# Patient Record
Sex: MALE | Race: NATIVE HAWAIIAN OR OTHER PACIFIC ISLANDER
[De-identification: names, ages, dates, MRNs, and addresses within clinical notes are randomized per-mention and may not be internally consistent; named-entity substitution may affect disease eponyms.]

---

## 2022-01-20 PROBLEM — Z00.00 ENCOUNTER FOR PREVENTIVE HEALTH EXAMINATION: Status: ACTIVE | Noted: 2022-01-20

## 2022-02-03 ENCOUNTER — NON-APPOINTMENT (OUTPATIENT)
Age: 37
End: 2022-02-03

## 2022-02-03 ENCOUNTER — APPOINTMENT (OUTPATIENT)
Dept: HEART AND VASCULAR | Facility: CLINIC | Age: 37
End: 2022-02-03
Payer: COMMERCIAL

## 2022-02-03 VITALS
OXYGEN SATURATION: 99 % | DIASTOLIC BLOOD PRESSURE: 80 MMHG | RESPIRATION RATE: 16 BRPM | HEART RATE: 58 BPM | SYSTOLIC BLOOD PRESSURE: 110 MMHG

## 2022-02-03 VITALS — HEIGHT: 70 IN | WEIGHT: 200 LBS | TEMPERATURE: 97.9 F | BODY MASS INDEX: 28.63 KG/M2

## 2022-02-03 DIAGNOSIS — R55 SYNCOPE AND COLLAPSE: ICD-10-CM

## 2022-02-03 DIAGNOSIS — Z82.5 FAMILY HISTORY OF ASTHMA AND OTHER CHRONIC LOWER RESPIRATORY DISEASES: ICD-10-CM

## 2022-02-03 DIAGNOSIS — Z78.9 OTHER SPECIFIED HEALTH STATUS: ICD-10-CM

## 2022-02-03 DIAGNOSIS — E55.9 VITAMIN D DEFICIENCY, UNSPECIFIED: ICD-10-CM

## 2022-02-03 PROCEDURE — 99205 OFFICE O/P NEW HI 60 MIN: CPT

## 2022-02-03 PROCEDURE — 93000 ELECTROCARDIOGRAM COMPLETE: CPT

## 2022-02-03 RX ORDER — CHROMIUM 200 MCG
1000 TABLET ORAL DAILY
Refills: 0 | Status: ACTIVE | COMMUNITY

## 2022-02-03 RX ORDER — MULTIVIT-MIN/FOLIC/VIT K/LYCOP 400-300MCG
1000 TABLET ORAL DAILY
Refills: 0 | Status: ACTIVE | COMMUNITY

## 2022-02-03 NOTE — DISCUSSION/SUMMARY
[Syncope of Unknown Origin] : syncope of unknown origin [Stable] : stable [None] : There are no changes in medication management [Patient] : the patient

## 2022-02-03 NOTE — HISTORY OF PRESENT ILLNESS
[FreeTextEntry1] : Braxton Panchal is a 38 yo male who presents for CV evaluation.  \par \par On December 24, 2021, while sitting at his kitchen table writing holiday cards, he experienced sudden severe nausea and lightheadedness.  After asking his wife to come to him, she noted that he was staring at his pen, not responding to her.  She states that he slumped and slid off his chair and his eyes rolled back.  She assisted him on the floor so as to maintain an open airway.  He did not have urinary or bowel incontinence.  He may have had some extremity jerking and tongue biting.  After a short period of time, he was awake and oriented but noted to have blurry vision.  After several minutes, he was back to normal.  He did not seek any medical attention til nearly 14 hours later at [facility].  Initial eval with blood work and CT of head were unremarkable.  Outpatient Neurology eval is ongoing ([facility]).  MRI of the head was also unremarkable.  Outpatient EEG will start tomorrow.  \par \par He denies cp, sob, pnd, orthopnea, edema, palp, or loc.\par \par He is active.  \par \par ECG today reveals sinus bradycardia.\par \par Clinical hx reviewed in detail.\par \par Recommendations:\par 1. collect records\par 2. EXSE\par 3. CPET\par 4. carotid duplex\par 5. TTT\par 6. telemetry\par \par

## 2022-02-15 ENCOUNTER — APPOINTMENT (OUTPATIENT)
Dept: HEART AND VASCULAR | Facility: CLINIC | Age: 37
End: 2022-02-15
Payer: COMMERCIAL

## 2022-02-15 VITALS
DIASTOLIC BLOOD PRESSURE: 90 MMHG | TEMPERATURE: 98.2 F | HEIGHT: 70 IN | WEIGHT: 204 LBS | SYSTOLIC BLOOD PRESSURE: 120 MMHG | OXYGEN SATURATION: 100 % | BODY MASS INDEX: 29.2 KG/M2

## 2022-02-15 PROCEDURE — 94010 BREATHING CAPACITY TEST: CPT | Mod: 59

## 2022-02-15 PROCEDURE — 94621 CARDIOPULM EXERCISE TESTING: CPT

## 2022-02-27 ENCOUNTER — TRANSCRIPTION ENCOUNTER (OUTPATIENT)
Age: 37
End: 2022-02-27

## 2022-03-04 ENCOUNTER — APPOINTMENT (OUTPATIENT)
Dept: HEART AND VASCULAR | Facility: CLINIC | Age: 37
End: 2022-03-04
Payer: COMMERCIAL

## 2022-03-04 ENCOUNTER — NON-APPOINTMENT (OUTPATIENT)
Age: 37
End: 2022-03-04

## 2022-03-04 VITALS
HEIGHT: 70 IN | HEART RATE: 65 BPM | WEIGHT: 196 LBS | SYSTOLIC BLOOD PRESSURE: 128 MMHG | BODY MASS INDEX: 28.06 KG/M2 | OXYGEN SATURATION: 98 % | DIASTOLIC BLOOD PRESSURE: 88 MMHG

## 2022-03-04 PROCEDURE — 93325 DOPPLER ECHO COLOR FLOW MAPG: CPT

## 2022-03-04 PROCEDURE — 93351 STRESS TTE COMPLETE: CPT

## 2022-03-04 PROCEDURE — 93320 DOPPLER ECHO COMPLETE: CPT

## 2022-03-04 PROCEDURE — 93880 EXTRACRANIAL BILAT STUDY: CPT

## 2022-03-09 ENCOUNTER — NON-APPOINTMENT (OUTPATIENT)
Age: 37
End: 2022-03-09

## 2022-03-10 ENCOUNTER — NON-APPOINTMENT (OUTPATIENT)
Age: 37
End: 2022-03-10